# Patient Record
Sex: FEMALE | Race: WHITE | HISPANIC OR LATINO | Employment: FULL TIME | URBAN - METROPOLITAN AREA
[De-identification: names, ages, dates, MRNs, and addresses within clinical notes are randomized per-mention and may not be internally consistent; named-entity substitution may affect disease eponyms.]

---

## 2019-08-14 ENCOUNTER — TRANSCRIBE ORDERS (OUTPATIENT)
Dept: URGENT CARE | Facility: MEDICAL CENTER | Age: 39
End: 2019-08-14

## 2019-08-14 ENCOUNTER — APPOINTMENT (OUTPATIENT)
Dept: LAB | Facility: MEDICAL CENTER | Age: 39
End: 2019-08-14

## 2019-08-14 ENCOUNTER — APPOINTMENT (OUTPATIENT)
Dept: URGENT CARE | Facility: MEDICAL CENTER | Age: 39
End: 2019-08-14

## 2019-08-14 DIAGNOSIS — Z02.1 PRE-EMPLOYMENT HEALTH SCREENING EXAMINATION: Primary | ICD-10-CM

## 2019-08-14 DIAGNOSIS — Z02.1 PRE-EMPLOYMENT HEALTH SCREENING EXAMINATION: ICD-10-CM

## 2019-08-14 PROCEDURE — 36415 COLL VENOUS BLD VENIPUNCTURE: CPT

## 2019-08-14 PROCEDURE — 86480 TB TEST CELL IMMUN MEASURE: CPT

## 2019-08-15 LAB
GAMMA INTERFERON BACKGROUND BLD IA-ACNC: 0.05 IU/ML
M TB IFN-G BLD-IMP: NEGATIVE
M TB IFN-G CD4+ BCKGRND COR BLD-ACNC: 0 IU/ML
M TB IFN-G CD4+ BCKGRND COR BLD-ACNC: 0.01 IU/ML
MITOGEN IGNF BCKGRD COR BLD-ACNC: >10 IU/ML

## 2020-11-24 ENCOUNTER — HOSPITAL ENCOUNTER (EMERGENCY)
Facility: HOSPITAL | Age: 40
Discharge: HOME | End: 2020-11-24
Attending: EMERGENCY MEDICINE | Admitting: EMERGENCY MEDICINE

## 2020-11-24 VITALS
BODY MASS INDEX: 30.18 KG/M2 | WEIGHT: 178.57 LBS | HEART RATE: 68 BPM | RESPIRATION RATE: 18 BRPM | OXYGEN SATURATION: 99 % | TEMPERATURE: 98.2 F | DIASTOLIC BLOOD PRESSURE: 60 MMHG | SYSTOLIC BLOOD PRESSURE: 126 MMHG

## 2020-11-24 DIAGNOSIS — B34.9 VIRAL SYNDROME: Primary | ICD-10-CM

## 2020-11-24 LAB — S PYO DNA THROAT QL NAA+PROBE: NORMAL

## 2020-11-24 PROCEDURE — 99283 EMERGENCY DEPT VISIT LOW MDM: CPT

## 2020-11-24 PROCEDURE — 87637 SARSCOV2&INF A&B&RSV AMP PRB: CPT | Performed by: PHYSICIAN ASSISTANT

## 2020-11-24 PROCEDURE — 87651 STREP A DNA AMP PROBE: CPT | Performed by: PHYSICIAN ASSISTANT

## 2020-11-24 PROCEDURE — 99282 EMERGENCY DEPT VISIT SF MDM: CPT | Performed by: PHYSICIAN ASSISTANT

## 2020-11-26 LAB
FLUAV RNA NPH QL NAA+PROBE: NOT DETECTED
FLUBV RNA NPH QL NAA+PROBE: NOT DETECTED
RSV RNA NPH QL NAA+PROBE: NOT DETECTED
SARS-COV-2 RNA NPH QL NAA+PROBE: DETECTED

## 2021-10-18 ENCOUNTER — ANESTHESIA EVENT (EMERGENCY)
Dept: PERIOP | Facility: HOSPITAL | Age: 41
End: 2021-10-18
Payer: COMMERCIAL

## 2021-10-18 ENCOUNTER — ANESTHESIA (EMERGENCY)
Dept: PERIOP | Facility: HOSPITAL | Age: 41
End: 2021-10-18
Payer: COMMERCIAL

## 2021-10-18 ENCOUNTER — HOSPITAL ENCOUNTER (OUTPATIENT)
Facility: HOSPITAL | Age: 41
Setting detail: OUTPATIENT SURGERY
Discharge: HOME | End: 2021-10-19
Attending: EMERGENCY MEDICINE | Admitting: OBSTETRICS & GYNECOLOGY
Payer: COMMERCIAL

## 2021-10-18 DIAGNOSIS — O03.4 RETAINED PRODUCTS OF CONCEPTION FOLLOWING ABORTION: ICD-10-CM

## 2021-10-18 DIAGNOSIS — Z33.2 MEDICAL ABORTION: Primary | ICD-10-CM

## 2021-10-18 DIAGNOSIS — N93.9 VAGINAL BLEEDING: ICD-10-CM

## 2021-10-18 DIAGNOSIS — Z98.890 S/P D&C (STATUS POST DILATION AND CURETTAGE): ICD-10-CM

## 2021-10-18 LAB
ABO GROUP BLD: NORMAL
ABO GROUP BLD: NORMAL
ALBUMIN SERPL BCP-MCNC: 3.4 G/DL (ref 3.5–5)
ALP SERPL-CCNC: 53 U/L (ref 46–116)
ALT SERPL W P-5'-P-CCNC: 22 U/L (ref 12–78)
ANION GAP SERPL CALCULATED.3IONS-SCNC: 11 MMOL/L (ref 4–13)
APTT PPP: 22 SECONDS (ref 23–37)
AST SERPL W P-5'-P-CCNC: 13 U/L (ref 5–45)
ATRIAL RATE: 110 BPM
B-HCG SERPL-ACNC: 7522.2 MIU/ML
BASOPHILS # BLD AUTO: 0.07 THOUSANDS/ΜL (ref 0–0.1)
BASOPHILS NFR BLD AUTO: 1 % (ref 0–1)
BILIRUB SERPL-MCNC: 0.22 MG/DL (ref 0.2–1)
BLD GP AB SCN SERPL QL: NEGATIVE
BUN SERPL-MCNC: 11 MG/DL (ref 5–25)
CALCIUM ALBUM COR SERPL-MCNC: 9.2 MG/DL (ref 8.3–10.1)
CALCIUM SERPL-MCNC: 8.7 MG/DL (ref 8.3–10.1)
CHLORIDE SERPL-SCNC: 103 MMOL/L (ref 100–108)
CO2 SERPL-SCNC: 21 MMOL/L (ref 21–32)
CREAT SERPL-MCNC: 0.92 MG/DL (ref 0.6–1.3)
EOSINOPHIL # BLD AUTO: 0.23 THOUSAND/ΜL (ref 0–0.61)
EOSINOPHIL NFR BLD AUTO: 2 % (ref 0–6)
ERYTHROCYTE [DISTWIDTH] IN BLOOD BY AUTOMATED COUNT: 13.1 % (ref 11.6–15.1)
EXT PREG TEST URINE: POSITIVE
EXT. CONTROL ED NAV: ABNORMAL
GFR SERPL CREATININE-BSD FRML MDRD: 77 ML/MIN/1.73SQ M
GLUCOSE SERPL-MCNC: 132 MG/DL (ref 65–140)
HCT VFR BLD AUTO: 33.6 % (ref 34.8–46.1)
HGB BLD-MCNC: 11.2 G/DL (ref 11.5–15.4)
IMM GRANULOCYTES # BLD AUTO: 0.03 THOUSAND/UL (ref 0–0.2)
IMM GRANULOCYTES NFR BLD AUTO: 0 % (ref 0–2)
INR PPP: 1.07 (ref 0.84–1.19)
LYMPHOCYTES # BLD AUTO: 5.9 THOUSANDS/ΜL (ref 0.6–4.47)
LYMPHOCYTES NFR BLD AUTO: 47 % (ref 14–44)
MCH RBC QN AUTO: 28.9 PG (ref 26.8–34.3)
MCHC RBC AUTO-ENTMCNC: 33.3 G/DL (ref 31.4–37.4)
MCV RBC AUTO: 87 FL (ref 82–98)
MONOCYTES # BLD AUTO: 0.96 THOUSAND/ΜL (ref 0.17–1.22)
MONOCYTES NFR BLD AUTO: 8 % (ref 4–12)
NEUTROPHILS # BLD AUTO: 5.21 THOUSANDS/ΜL (ref 1.85–7.62)
NEUTS SEG NFR BLD AUTO: 42 % (ref 43–75)
NRBC BLD AUTO-RTO: 0 /100 WBCS
P AXIS: 64 DEGREES
PLATELET # BLD AUTO: 295 THOUSANDS/UL (ref 149–390)
PMV BLD AUTO: 10.1 FL (ref 8.9–12.7)
POTASSIUM SERPL-SCNC: 3.3 MMOL/L (ref 3.5–5.3)
PR INTERVAL: 138 MS
PROT SERPL-MCNC: 7.1 G/DL (ref 6.4–8.2)
PROTHROMBIN TIME: 13.5 SECONDS (ref 11.6–14.5)
QRS AXIS: 62 DEGREES
QRSD INTERVAL: 78 MS
QT INTERVAL: 328 MS
QTC INTERVAL: 435 MS
RBC # BLD AUTO: 3.87 MILLION/UL (ref 3.81–5.12)
RH BLD: POSITIVE
RH BLD: POSITIVE
SODIUM SERPL-SCNC: 135 MMOL/L (ref 136–145)
SPECIMEN EXPIRATION DATE: NORMAL
T WAVE AXIS: 58 DEGREES
VENTRICULAR RATE: 106 BPM
WBC # BLD AUTO: 12.4 THOUSAND/UL (ref 4.31–10.16)

## 2021-10-18 PROCEDURE — 99285 EMERGENCY DEPT VISIT HI MDM: CPT

## 2021-10-18 PROCEDURE — 88305 TISSUE EXAM BY PATHOLOGIST: CPT | Performed by: PATHOLOGY

## 2021-10-18 PROCEDURE — 59812 TREATMENT OF MISCARRIAGE: CPT | Performed by: OBSTETRICS & GYNECOLOGY

## 2021-10-18 PROCEDURE — 81025 URINE PREGNANCY TEST: CPT | Performed by: EMERGENCY MEDICINE

## 2021-10-18 PROCEDURE — 87491 CHLMYD TRACH DNA AMP PROBE: CPT | Performed by: EMERGENCY MEDICINE

## 2021-10-18 PROCEDURE — 87591 N.GONORRHOEAE DNA AMP PROB: CPT | Performed by: EMERGENCY MEDICINE

## 2021-10-18 PROCEDURE — 80053 COMPREHEN METABOLIC PANEL: CPT | Performed by: EMERGENCY MEDICINE

## 2021-10-18 PROCEDURE — 86901 BLOOD TYPING SEROLOGIC RH(D): CPT | Performed by: EMERGENCY MEDICINE

## 2021-10-18 PROCEDURE — 93010 ELECTROCARDIOGRAM REPORT: CPT | Performed by: INTERNAL MEDICINE

## 2021-10-18 PROCEDURE — 99203 OFFICE O/P NEW LOW 30 MIN: CPT | Performed by: OBSTETRICS & GYNECOLOGY

## 2021-10-18 PROCEDURE — 85730 THROMBOPLASTIN TIME PARTIAL: CPT | Performed by: EMERGENCY MEDICINE

## 2021-10-18 PROCEDURE — 86900 BLOOD TYPING SEROLOGIC ABO: CPT | Performed by: EMERGENCY MEDICINE

## 2021-10-18 PROCEDURE — 99285 EMERGENCY DEPT VISIT HI MDM: CPT | Performed by: EMERGENCY MEDICINE

## 2021-10-18 PROCEDURE — 87040 BLOOD CULTURE FOR BACTERIA: CPT | Performed by: EMERGENCY MEDICINE

## 2021-10-18 PROCEDURE — 85025 COMPLETE CBC W/AUTO DIFF WBC: CPT | Performed by: EMERGENCY MEDICINE

## 2021-10-18 PROCEDURE — 88342 IMHCHEM/IMCYTCHM 1ST ANTB: CPT | Performed by: PATHOLOGY

## 2021-10-18 PROCEDURE — 93005 ELECTROCARDIOGRAM TRACING: CPT

## 2021-10-18 PROCEDURE — 84702 CHORIONIC GONADOTROPIN TEST: CPT | Performed by: EMERGENCY MEDICINE

## 2021-10-18 PROCEDURE — 85610 PROTHROMBIN TIME: CPT | Performed by: EMERGENCY MEDICINE

## 2021-10-18 PROCEDURE — 86850 RBC ANTIBODY SCREEN: CPT | Performed by: EMERGENCY MEDICINE

## 2021-10-18 PROCEDURE — 36415 COLL VENOUS BLD VENIPUNCTURE: CPT | Performed by: EMERGENCY MEDICINE

## 2021-10-18 PROCEDURE — 88182 CELL MARKER STUDY: CPT | Performed by: PATHOLOGY

## 2021-10-18 PROCEDURE — NC001 PR NO CHARGE: Performed by: OBSTETRICS & GYNECOLOGY

## 2021-10-18 RX ORDER — ACETAMINOPHEN 325 MG/1
975 TABLET ORAL ONCE
Status: COMPLETED | OUTPATIENT
Start: 2021-10-18 | End: 2021-10-18

## 2021-10-18 RX ORDER — RIBOFLAVIN (VITAMIN B2) 100 MG
1 TABLET ORAL DAILY
COMMUNITY

## 2021-10-18 RX ORDER — ONDANSETRON 2 MG/ML
4 INJECTION INTRAMUSCULAR; INTRAVENOUS EVERY 6 HOURS PRN
Status: DISCONTINUED | OUTPATIENT
Start: 2021-10-18 | End: 2021-10-19 | Stop reason: HOSPADM

## 2021-10-18 RX ORDER — ONDANSETRON 2 MG/ML
4 INJECTION INTRAMUSCULAR; INTRAVENOUS ONCE AS NEEDED
Status: DISCONTINUED | OUTPATIENT
Start: 2021-10-18 | End: 2021-10-18 | Stop reason: HOSPADM

## 2021-10-18 RX ORDER — MIDAZOLAM HYDROCHLORIDE 2 MG/2ML
INJECTION, SOLUTION INTRAMUSCULAR; INTRAVENOUS AS NEEDED
Status: DISCONTINUED | OUTPATIENT
Start: 2021-10-18 | End: 2021-10-18

## 2021-10-18 RX ORDER — DEXAMETHASONE SODIUM PHOSPHATE 10 MG/ML
INJECTION, SOLUTION INTRAMUSCULAR; INTRAVENOUS AS NEEDED
Status: DISCONTINUED | OUTPATIENT
Start: 2021-10-18 | End: 2021-10-18

## 2021-10-18 RX ORDER — DOXYCYCLINE HYCLATE 100 MG/1
200 CAPSULE ORAL ONCE
Status: COMPLETED | OUTPATIENT
Start: 2021-10-18 | End: 2021-10-18

## 2021-10-18 RX ORDER — HYDROMORPHONE HCL/PF 1 MG/ML
0.5 SYRINGE (ML) INJECTION
Status: DISCONTINUED | OUTPATIENT
Start: 2021-10-18 | End: 2021-10-18 | Stop reason: HOSPADM

## 2021-10-18 RX ORDER — PROPOFOL 10 MG/ML
INJECTION, EMULSION INTRAVENOUS AS NEEDED
Status: DISCONTINUED | OUTPATIENT
Start: 2021-10-18 | End: 2021-10-18

## 2021-10-18 RX ORDER — KETOROLAC TROMETHAMINE 30 MG/ML
INJECTION, SOLUTION INTRAMUSCULAR; INTRAVENOUS AS NEEDED
Status: DISCONTINUED | OUTPATIENT
Start: 2021-10-18 | End: 2021-10-18

## 2021-10-18 RX ORDER — SODIUM CHLORIDE 9 MG/ML
INJECTION, SOLUTION INTRAVENOUS CONTINUOUS PRN
Status: DISCONTINUED | OUTPATIENT
Start: 2021-10-18 | End: 2021-10-18

## 2021-10-18 RX ORDER — SUCCINYLCHOLINE/SOD CL,ISO/PF 100 MG/5ML
SYRINGE (ML) INTRAVENOUS AS NEEDED
Status: DISCONTINUED | OUTPATIENT
Start: 2021-10-18 | End: 2021-10-18

## 2021-10-18 RX ORDER — LIDOCAINE HYDROCHLORIDE 10 MG/ML
INJECTION, SOLUTION EPIDURAL; INFILTRATION; INTRACAUDAL; PERINEURAL AS NEEDED
Status: DISCONTINUED | OUTPATIENT
Start: 2021-10-18 | End: 2021-10-18

## 2021-10-18 RX ORDER — ONDANSETRON 2 MG/ML
INJECTION INTRAMUSCULAR; INTRAVENOUS AS NEEDED
Status: DISCONTINUED | OUTPATIENT
Start: 2021-10-18 | End: 2021-10-18

## 2021-10-18 RX ORDER — FENTANYL CITRATE 50 UG/ML
INJECTION, SOLUTION INTRAMUSCULAR; INTRAVENOUS AS NEEDED
Status: DISCONTINUED | OUTPATIENT
Start: 2021-10-18 | End: 2021-10-18

## 2021-10-18 RX ORDER — ACETAMINOPHEN 325 MG/1
650 TABLET ORAL EVERY 6 HOURS SCHEDULED
Status: DISCONTINUED | OUTPATIENT
Start: 2021-10-19 | End: 2021-10-19 | Stop reason: HOSPADM

## 2021-10-18 RX ORDER — FENTANYL CITRATE/PF 50 MCG/ML
50 SYRINGE (ML) INJECTION
Status: DISCONTINUED | OUTPATIENT
Start: 2021-10-18 | End: 2021-10-18 | Stop reason: HOSPADM

## 2021-10-18 RX ORDER — IBUPROFEN 600 MG/1
600 TABLET ORAL EVERY 6 HOURS PRN
Qty: 30 TABLET | Refills: 0
Start: 2021-10-18 | End: 2021-10-19 | Stop reason: SDUPTHER

## 2021-10-18 RX ADMIN — MIDAZOLAM 2 MG: 1 INJECTION INTRAMUSCULAR; INTRAVENOUS at 20:06

## 2021-10-18 RX ADMIN — FENTANYL CITRATE 100 MCG: 50 INJECTION INTRAMUSCULAR; INTRAVENOUS at 20:16

## 2021-10-18 RX ADMIN — PROPOFOL 200 MG: 10 INJECTION, EMULSION INTRAVENOUS at 20:16

## 2021-10-18 RX ADMIN — ONDANSETRON 4 MG: 2 INJECTION INTRAMUSCULAR; INTRAVENOUS at 20:24

## 2021-10-18 RX ADMIN — KETOROLAC TROMETHAMINE 30 MG: 30 INJECTION, SOLUTION INTRAMUSCULAR at 20:36

## 2021-10-18 RX ADMIN — FENTANYL CITRATE 50 MCG: 50 INJECTION INTRAMUSCULAR; INTRAVENOUS at 21:11

## 2021-10-18 RX ADMIN — SODIUM CHLORIDE 1000 ML: 0.9 INJECTION, SOLUTION INTRAVENOUS at 16:50

## 2021-10-18 RX ADMIN — DOXYCYCLINE 200 MG: 100 CAPSULE ORAL at 19:18

## 2021-10-18 RX ADMIN — Medication 100 MG: at 20:16

## 2021-10-18 RX ADMIN — DEXAMETHASONE SODIUM PHOSPHATE 4 MG: 10 INJECTION INTRAMUSCULAR; INTRAVENOUS at 20:24

## 2021-10-18 RX ADMIN — SODIUM CHLORIDE: 0.9 INJECTION, SOLUTION INTRAVENOUS at 20:05

## 2021-10-18 RX ADMIN — LIDOCAINE HYDROCHLORIDE 60 MG: 10 INJECTION, SOLUTION EPIDURAL; INFILTRATION; INTRACAUDAL; PERINEURAL at 20:16

## 2021-10-18 RX ADMIN — ACETAMINOPHEN 975 MG: 325 TABLET, FILM COATED ORAL at 17:19

## 2021-10-19 VITALS
WEIGHT: 189.38 LBS | SYSTOLIC BLOOD PRESSURE: 109 MMHG | TEMPERATURE: 97.8 F | BODY MASS INDEX: 31.55 KG/M2 | DIASTOLIC BLOOD PRESSURE: 67 MMHG | RESPIRATION RATE: 18 BRPM | HEIGHT: 65 IN | HEART RATE: 89 BPM | OXYGEN SATURATION: 100 %

## 2021-10-19 PROCEDURE — NC001 PR NO CHARGE: Performed by: OBSTETRICS & GYNECOLOGY

## 2021-10-19 RX ORDER — IBUPROFEN 600 MG/1
600 TABLET ORAL EVERY 6 HOURS PRN
Qty: 30 TABLET | Refills: 0 | Status: SHIPPED | OUTPATIENT
Start: 2021-10-19 | End: 2021-10-31

## 2021-10-19 RX ORDER — NORETHINDRONE ACETATE AND ETHINYL ESTRADIOL 1MG-20(21)
1 KIT ORAL DAILY
Qty: 28 TABLET | Refills: 12 | Status: SHIPPED | OUTPATIENT
Start: 2021-10-19 | End: 2022-10-19

## 2021-10-19 RX ADMIN — ACETAMINOPHEN 650 MG: 325 TABLET, FILM COATED ORAL at 11:07

## 2021-10-19 RX ADMIN — ACETAMINOPHEN 650 MG: 325 TABLET, FILM COATED ORAL at 06:23

## 2021-10-20 LAB
C TRACH DNA SPEC QL NAA+PROBE: ABNORMAL
N GONORRHOEA DNA SPEC QL NAA+PROBE: ABNORMAL

## 2021-10-23 LAB
BACTERIA BLD CULT: NORMAL
BACTERIA BLD CULT: NORMAL

## 2021-10-29 DIAGNOSIS — Z98.890 S/P D&C (STATUS POST DILATION AND CURETTAGE): ICD-10-CM

## 2021-10-31 RX ORDER — IBUPROFEN 600 MG/1
TABLET ORAL
Qty: 30 TABLET | Refills: 0 | Status: SHIPPED | OUTPATIENT
Start: 2021-10-31 | End: 2021-12-01

## 2021-11-09 ENCOUNTER — TELEPHONE (OUTPATIENT)
Dept: OBGYN CLINIC | Facility: CLINIC | Age: 41
End: 2021-11-09

## 2021-11-10 ENCOUNTER — TELEPHONE (OUTPATIENT)
Dept: OBGYN CLINIC | Facility: CLINIC | Age: 41
End: 2021-11-10

## 2021-11-22 ENCOUNTER — TELEPHONE (OUTPATIENT)
Dept: OTHER | Facility: HOSPITAL | Age: 41
End: 2021-11-22

## 2021-11-22 DIAGNOSIS — O08.89 PARTIAL MOLAR PREGNANCY: Primary | ICD-10-CM

## 2021-11-26 ENCOUNTER — APPOINTMENT (OUTPATIENT)
Dept: LAB | Facility: HOSPITAL | Age: 41
End: 2021-11-26
Payer: COMMERCIAL

## 2021-11-26 ENCOUNTER — HOSPITAL ENCOUNTER (OUTPATIENT)
Dept: RADIOLOGY | Facility: HOSPITAL | Age: 41
Discharge: HOME | End: 2021-11-26
Payer: COMMERCIAL

## 2021-11-26 DIAGNOSIS — O08.89 PARTIAL MOLAR PREGNANCY: ICD-10-CM

## 2021-11-26 LAB
ALBUMIN SERPL BCP-MCNC: 3.6 G/DL (ref 3.5–5)
ALP SERPL-CCNC: 65 U/L (ref 46–116)
ALT SERPL W P-5'-P-CCNC: 25 U/L (ref 12–78)
ANION GAP SERPL CALCULATED.3IONS-SCNC: 10 MMOL/L (ref 4–13)
AST SERPL W P-5'-P-CCNC: 16 U/L (ref 5–45)
B-HCG SERPL-ACNC: 15.4 MIU/ML
BILIRUB SERPL-MCNC: 0.17 MG/DL (ref 0.2–1)
BUN SERPL-MCNC: 12 MG/DL (ref 5–25)
CALCIUM SERPL-MCNC: 8.8 MG/DL (ref 8.3–10.1)
CHLORIDE SERPL-SCNC: 104 MMOL/L (ref 100–108)
CO2 SERPL-SCNC: 26 MMOL/L (ref 21–32)
CREAT SERPL-MCNC: 0.78 MG/DL (ref 0.6–1.3)
ERYTHROCYTE [DISTWIDTH] IN BLOOD BY AUTOMATED COUNT: 17.1 % (ref 11.6–15.1)
GFR SERPL CREATININE-BSD FRML MDRD: 94 ML/MIN/1.73SQ M
GLUCOSE P FAST SERPL-MCNC: 88 MG/DL (ref 65–99)
HCT VFR BLD AUTO: 27.9 % (ref 34.8–46.1)
HGB BLD-MCNC: 8.2 G/DL (ref 11.5–15.4)
MCH RBC QN AUTO: 23.4 PG (ref 26.8–34.3)
MCHC RBC AUTO-ENTMCNC: 29.4 G/DL (ref 31.4–37.4)
MCV RBC AUTO: 80 FL (ref 82–98)
PLATELET # BLD AUTO: 420 THOUSANDS/UL (ref 149–390)
PMV BLD AUTO: 10 FL (ref 8.9–12.7)
POTASSIUM SERPL-SCNC: 3.9 MMOL/L (ref 3.5–5.3)
PROT SERPL-MCNC: 7.2 G/DL (ref 6.4–8.2)
RBC # BLD AUTO: 3.5 MILLION/UL (ref 3.81–5.12)
SODIUM SERPL-SCNC: 140 MMOL/L (ref 136–145)
TSH SERPL DL<=0.05 MIU/L-ACNC: 0.96 UIU/ML (ref 0.36–3.74)
WBC # BLD AUTO: 6.53 THOUSAND/UL (ref 4.31–10.16)

## 2021-11-26 PROCEDURE — 36415 COLL VENOUS BLD VENIPUNCTURE: CPT

## 2021-11-26 PROCEDURE — 84702 CHORIONIC GONADOTROPIN TEST: CPT

## 2021-11-26 PROCEDURE — 84443 ASSAY THYROID STIM HORMONE: CPT

## 2021-11-26 PROCEDURE — 71046 X-RAY EXAM CHEST 2 VIEWS: CPT

## 2021-11-26 PROCEDURE — 80053 COMPREHEN METABOLIC PANEL: CPT

## 2021-11-26 PROCEDURE — 85027 COMPLETE CBC AUTOMATED: CPT

## 2021-11-29 PROBLEM — Z98.890 S/P D&C (STATUS POST DILATION AND CURETTAGE): Status: RESOLVED | Noted: 2021-10-18 | Resolved: 2021-11-29

## 2021-11-29 PROBLEM — O02.0 MOLAR PREGNANCY: Status: ACTIVE | Noted: 2021-11-29

## 2021-11-29 PROBLEM — O03.4 RETAINED PRODUCTS OF CONCEPTION FOLLOWING ABORTION: Status: RESOLVED | Noted: 2021-10-18 | Resolved: 2021-11-29

## 2021-12-01 ENCOUNTER — OFFICE VISIT (OUTPATIENT)
Dept: OBGYN CLINIC | Facility: CLINIC | Age: 41
End: 2021-12-01

## 2021-12-01 VITALS
DIASTOLIC BLOOD PRESSURE: 76 MMHG | WEIGHT: 178.8 LBS | HEIGHT: 65 IN | BODY MASS INDEX: 29.79 KG/M2 | SYSTOLIC BLOOD PRESSURE: 116 MMHG | HEART RATE: 78 BPM

## 2021-12-01 DIAGNOSIS — L08.9 SKIN PUSTULE: ICD-10-CM

## 2021-12-01 DIAGNOSIS — D50.8 IRON DEFICIENCY ANEMIA SECONDARY TO INADEQUATE DIETARY IRON INTAKE: ICD-10-CM

## 2021-12-01 DIAGNOSIS — O02.0 MOLAR PREGNANCY: Primary | ICD-10-CM

## 2021-12-01 PROCEDURE — 99203 OFFICE O/P NEW LOW 30 MIN: CPT | Performed by: OBSTETRICS & GYNECOLOGY

## 2021-12-01 RX ORDER — CYANOCOBALAMIN 1000 UG/ML
1000 INJECTION INTRAMUSCULAR; SUBCUTANEOUS ONCE
Status: CANCELLED | OUTPATIENT
Start: 2021-12-08 | End: 2021-12-01

## 2021-12-05 ENCOUNTER — APPOINTMENT (OUTPATIENT)
Dept: LAB | Facility: HOSPITAL | Age: 41
End: 2021-12-05
Payer: COMMERCIAL

## 2021-12-05 DIAGNOSIS — O02.0 MOLAR PREGNANCY: ICD-10-CM

## 2021-12-05 LAB — B-HCG SERPL-ACNC: 5.8 MIU/ML

## 2021-12-05 PROCEDURE — 36415 COLL VENOUS BLD VENIPUNCTURE: CPT

## 2021-12-05 PROCEDURE — 84702 CHORIONIC GONADOTROPIN TEST: CPT

## 2021-12-06 ENCOUNTER — TELEPHONE (OUTPATIENT)
Dept: OTHER | Facility: HOSPITAL | Age: 41
End: 2021-12-06

## 2021-12-06 DIAGNOSIS — O08.89 PARTIAL MOLAR PREGNANCY: Primary | ICD-10-CM

## 2021-12-08 ENCOUNTER — HOSPITAL ENCOUNTER (OUTPATIENT)
Dept: INFUSION CENTER | Facility: CLINIC | Age: 41
Discharge: HOME | End: 2021-12-08

## 2021-12-08 DIAGNOSIS — D50.8 IRON DEFICIENCY ANEMIA SECONDARY TO INADEQUATE DIETARY IRON INTAKE: Primary | ICD-10-CM

## 2021-12-08 DIAGNOSIS — O02.0 MOLAR PREGNANCY: ICD-10-CM

## 2021-12-08 RX ORDER — SODIUM CHLORIDE 9 MG/ML
20 INJECTION, SOLUTION INTRAVENOUS ONCE
Status: DISCONTINUED | OUTPATIENT
Start: 2021-12-08 | End: 2021-12-11 | Stop reason: HOSPADM

## 2021-12-08 RX ORDER — SODIUM CHLORIDE 9 MG/ML
20 INJECTION, SOLUTION INTRAVENOUS ONCE
Status: CANCELLED | OUTPATIENT
Start: 2021-12-08

## 2021-12-08 RX ORDER — CYANOCOBALAMIN 1000 UG/ML
1000 INJECTION INTRAMUSCULAR; SUBCUTANEOUS ONCE
Status: CANCELLED | OUTPATIENT
Start: 2021-12-10 | End: 2021-12-08

## 2021-12-08 RX ORDER — CYANOCOBALAMIN 1000 UG/ML
1000 INJECTION INTRAMUSCULAR; SUBCUTANEOUS ONCE
Status: DISCONTINUED | OUTPATIENT
Start: 2021-12-08 | End: 2021-12-11 | Stop reason: HOSPADM

## 2021-12-08 RX ORDER — SODIUM CHLORIDE 9 MG/ML
20 INJECTION, SOLUTION INTRAVENOUS ONCE
Status: CANCELLED | OUTPATIENT
Start: 2021-12-10

## 2021-12-10 ENCOUNTER — HOSPITAL ENCOUNTER (OUTPATIENT)
Dept: INFUSION CENTER | Facility: CLINIC | Age: 41
Discharge: HOME | End: 2021-12-10
Payer: COMMERCIAL

## 2021-12-10 VITALS
HEART RATE: 92 BPM | RESPIRATION RATE: 18 BRPM | DIASTOLIC BLOOD PRESSURE: 72 MMHG | SYSTOLIC BLOOD PRESSURE: 111 MMHG | TEMPERATURE: 98.1 F

## 2021-12-10 DIAGNOSIS — D50.8 IRON DEFICIENCY ANEMIA SECONDARY TO INADEQUATE DIETARY IRON INTAKE: Primary | ICD-10-CM

## 2021-12-10 DIAGNOSIS — O02.0 MOLAR PREGNANCY: ICD-10-CM

## 2021-12-10 PROCEDURE — 96365 THER/PROPH/DIAG IV INF INIT: CPT

## 2021-12-10 PROCEDURE — 96372 THER/PROPH/DIAG INJ SC/IM: CPT

## 2021-12-10 RX ORDER — CYANOCOBALAMIN 1000 UG/ML
1000 INJECTION INTRAMUSCULAR; SUBCUTANEOUS ONCE
Status: COMPLETED | OUTPATIENT
Start: 2021-12-10 | End: 2021-12-10

## 2021-12-10 RX ORDER — CYANOCOBALAMIN 1000 UG/ML
1000 INJECTION INTRAMUSCULAR; SUBCUTANEOUS ONCE
Status: CANCELLED | OUTPATIENT
Start: 2021-12-10 | End: 2021-12-10

## 2021-12-10 RX ORDER — SODIUM CHLORIDE 9 MG/ML
20 INJECTION, SOLUTION INTRAVENOUS ONCE
Status: CANCELLED | OUTPATIENT
Start: 2021-12-12

## 2021-12-10 RX ORDER — SODIUM CHLORIDE 9 MG/ML
20 INJECTION, SOLUTION INTRAVENOUS ONCE
Status: COMPLETED | OUTPATIENT
Start: 2021-12-10 | End: 2021-12-10

## 2021-12-10 RX ORDER — SODIUM CHLORIDE 9 MG/ML
20 INJECTION, SOLUTION INTRAVENOUS ONCE
Status: CANCELLED | OUTPATIENT
Start: 2021-12-13

## 2021-12-10 RX ADMIN — SODIUM CHLORIDE 200 MG: 9 INJECTION, SOLUTION INTRAVENOUS at 09:38

## 2021-12-10 RX ADMIN — CYANOCOBALAMIN 1000 MCG: 1000 INJECTION, SOLUTION INTRAMUSCULAR at 09:29

## 2021-12-10 RX ADMIN — SODIUM CHLORIDE 20 ML/HR: 0.9 INJECTION, SOLUTION INTRAVENOUS at 09:37

## 2021-12-13 ENCOUNTER — HOSPITAL ENCOUNTER (OUTPATIENT)
Dept: INFUSION CENTER | Facility: CLINIC | Age: 41
Discharge: HOME | End: 2021-12-13
Payer: COMMERCIAL

## 2021-12-13 DIAGNOSIS — D50.8 IRON DEFICIENCY ANEMIA SECONDARY TO INADEQUATE DIETARY IRON INTAKE: Primary | ICD-10-CM

## 2021-12-13 PROCEDURE — 96365 THER/PROPH/DIAG IV INF INIT: CPT

## 2021-12-13 RX ORDER — SODIUM CHLORIDE 9 MG/ML
20 INJECTION, SOLUTION INTRAVENOUS ONCE
Status: CANCELLED | OUTPATIENT
Start: 2021-12-13

## 2021-12-13 RX ORDER — SODIUM CHLORIDE 9 MG/ML
20 INJECTION, SOLUTION INTRAVENOUS ONCE
Status: COMPLETED | OUTPATIENT
Start: 2021-12-13 | End: 2021-12-13

## 2021-12-13 RX ADMIN — IRON SUCROSE 200 MG: 20 INJECTION, SOLUTION INTRAVENOUS at 10:19

## 2021-12-13 RX ADMIN — SODIUM CHLORIDE 20 ML/HR: 0.9 INJECTION, SOLUTION INTRAVENOUS at 10:10

## 2021-12-18 ENCOUNTER — APPOINTMENT (OUTPATIENT)
Dept: LAB | Facility: HOSPITAL | Age: 41
End: 2021-12-18
Payer: COMMERCIAL

## 2021-12-18 DIAGNOSIS — O08.89 PARTIAL MOLAR PREGNANCY: ICD-10-CM

## 2021-12-18 LAB — B-HCG SERPL-ACNC: <2 MIU/ML

## 2021-12-18 PROCEDURE — 36415 COLL VENOUS BLD VENIPUNCTURE: CPT

## 2021-12-18 PROCEDURE — 84702 CHORIONIC GONADOTROPIN TEST: CPT

## 2021-12-20 DIAGNOSIS — O02.0 MOLAR PREGNANCY: Primary | ICD-10-CM

## 2021-12-27 ENCOUNTER — APPOINTMENT (OUTPATIENT)
Dept: LAB | Facility: HOSPITAL | Age: 41
End: 2021-12-27
Payer: COMMERCIAL

## 2021-12-27 DIAGNOSIS — O02.0 MOLAR PREGNANCY: ICD-10-CM

## 2021-12-27 DIAGNOSIS — O02.0 MOLAR PREGNANCY: Primary | ICD-10-CM

## 2021-12-27 LAB
B-HCG SERPL-ACNC: <2 MIU/ML
ERYTHROCYTE [DISTWIDTH] IN BLOOD BY AUTOMATED COUNT: 22.6 % (ref 11.6–15.1)
HCT VFR BLD AUTO: 33.2 % (ref 34.8–46.1)
HGB BLD-MCNC: 10 G/DL (ref 11.5–15.4)
MCH RBC QN AUTO: 23.9 PG (ref 26.8–34.3)
MCHC RBC AUTO-ENTMCNC: 30.1 G/DL (ref 31.4–37.4)
MCV RBC AUTO: 79 FL (ref 82–98)
PLATELET # BLD AUTO: 343 THOUSANDS/UL (ref 149–390)
PMV BLD AUTO: 10.1 FL (ref 8.9–12.7)
RBC # BLD AUTO: 4.19 MILLION/UL (ref 3.81–5.12)
WBC # BLD AUTO: 8.42 THOUSAND/UL (ref 4.31–10.16)

## 2021-12-27 PROCEDURE — 36415 COLL VENOUS BLD VENIPUNCTURE: CPT

## 2021-12-27 PROCEDURE — 85027 COMPLETE CBC AUTOMATED: CPT

## 2021-12-27 PROCEDURE — 84702 CHORIONIC GONADOTROPIN TEST: CPT

## 2022-01-04 ENCOUNTER — TELEPHONE (OUTPATIENT)
Dept: UROLOGY | Facility: HOSPITAL | Age: 42
End: 2022-01-04

## 2022-01-04 ENCOUNTER — TELEPHONE (OUTPATIENT)
Dept: OBGYN CLINIC | Facility: CLINIC | Age: 42
End: 2022-01-04

## 2022-01-04 NOTE — TELEPHONE ENCOUNTER
----- Message from Lamont Gatica sent at 1/4/2022 11:43 AM EST -----  Regarding: Good Morning I went to  the medicine last week they told me that it was not Send to the pharmacy I been waiting for the meditation for almost a week thanks   Waiting for Medication that you prescribe me

## 2022-01-07 ENCOUNTER — APPOINTMENT (OUTPATIENT)
Dept: LAB | Facility: HOSPITAL | Age: 42
End: 2022-01-07
Payer: COMMERCIAL

## 2022-01-07 DIAGNOSIS — O02.0 MOLAR PREGNANCY: ICD-10-CM

## 2022-01-07 LAB — B-HCG SERPL-ACNC: <2 MIU/ML

## 2022-01-07 PROCEDURE — 84702 CHORIONIC GONADOTROPIN TEST: CPT

## 2022-01-07 PROCEDURE — 36415 COLL VENOUS BLD VENIPUNCTURE: CPT

## 2022-04-13 ENCOUNTER — APPOINTMENT (EMERGENCY)
Dept: RADIOLOGY | Facility: HOSPITAL | Age: 42
End: 2022-04-13
Payer: COMMERCIAL

## 2022-04-13 ENCOUNTER — HOSPITAL ENCOUNTER (EMERGENCY)
Facility: HOSPITAL | Age: 42
Discharge: HOME | End: 2022-04-13
Attending: EMERGENCY MEDICINE | Admitting: EMERGENCY MEDICINE
Payer: COMMERCIAL

## 2022-04-13 VITALS
OXYGEN SATURATION: 100 % | BODY MASS INDEX: 29.39 KG/M2 | WEIGHT: 176.59 LBS | SYSTOLIC BLOOD PRESSURE: 114 MMHG | DIASTOLIC BLOOD PRESSURE: 62 MMHG | RESPIRATION RATE: 16 BRPM | TEMPERATURE: 98 F | HEART RATE: 72 BPM

## 2022-04-13 DIAGNOSIS — R07.89 CHEST TIGHTNESS: Primary | ICD-10-CM

## 2022-04-13 LAB
ANION GAP SERPL CALCULATED.3IONS-SCNC: 7 MMOL/L (ref 4–13)
ATRIAL RATE: 70 BPM
ATRIAL RATE: 76 BPM
BASOPHILS # BLD AUTO: 0.03 THOUSANDS/ΜL (ref 0–0.1)
BASOPHILS NFR BLD AUTO: 1 % (ref 0–1)
BUN SERPL-MCNC: 14 MG/DL (ref 5–25)
CALCIUM SERPL-MCNC: 8.7 MG/DL (ref 8.3–10.1)
CARDIAC TROPONIN I PNL SERPL HS: <2 NG/L
CHLORIDE SERPL-SCNC: 104 MMOL/L (ref 100–108)
CO2 SERPL-SCNC: 28 MMOL/L (ref 21–32)
CREAT SERPL-MCNC: 0.97 MG/DL (ref 0.6–1.3)
EOSINOPHIL # BLD AUTO: 0.11 THOUSAND/ΜL (ref 0–0.61)
EOSINOPHIL NFR BLD AUTO: 2 % (ref 0–6)
ERYTHROCYTE [DISTWIDTH] IN BLOOD BY AUTOMATED COUNT: 18 % (ref 11.6–15.1)
GFR SERPL CREATININE-BSD FRML MDRD: 71 ML/MIN/1.73SQ M
GLUCOSE SERPL-MCNC: 97 MG/DL (ref 65–140)
HCT VFR BLD AUTO: 37.7 % (ref 34.8–46.1)
HGB BLD-MCNC: 12.2 G/DL (ref 11.5–15.4)
IMM GRANULOCYTES # BLD AUTO: 0.01 THOUSAND/UL (ref 0–0.2)
IMM GRANULOCYTES NFR BLD AUTO: 0 % (ref 0–2)
LYMPHOCYTES # BLD AUTO: 2.36 THOUSANDS/ΜL (ref 0.6–4.47)
LYMPHOCYTES NFR BLD AUTO: 44 % (ref 14–44)
MCH RBC QN AUTO: 28.6 PG (ref 26.8–34.3)
MCHC RBC AUTO-ENTMCNC: 32.4 G/DL (ref 31.4–37.4)
MCV RBC AUTO: 89 FL (ref 82–98)
MONOCYTES # BLD AUTO: 0.45 THOUSAND/ΜL (ref 0.17–1.22)
MONOCYTES NFR BLD AUTO: 9 % (ref 4–12)
NEUTROPHILS # BLD AUTO: 2.35 THOUSANDS/ΜL (ref 1.85–7.62)
NEUTS SEG NFR BLD AUTO: 44 % (ref 43–75)
NRBC BLD AUTO-RTO: 0 /100 WBCS
P AXIS: 72 DEGREES
P AXIS: 72 DEGREES
PLATELET # BLD AUTO: 225 THOUSANDS/UL (ref 149–390)
PMV BLD AUTO: 9.7 FL (ref 8.9–12.7)
POTASSIUM SERPL-SCNC: 4.3 MMOL/L (ref 3.5–5.3)
PR INTERVAL: 150 MS
PR INTERVAL: 152 MS
QRS AXIS: 68 DEGREES
QRS AXIS: 68 DEGREES
QRSD INTERVAL: 86 MS
QRSD INTERVAL: 88 MS
QT INTERVAL: 366 MS
QT INTERVAL: 372 MS
QTC INTERVAL: 401 MS
QTC INTERVAL: 411 MS
RBC # BLD AUTO: 4.26 MILLION/UL (ref 3.81–5.12)
SODIUM SERPL-SCNC: 139 MMOL/L (ref 136–145)
T WAVE AXIS: 57 DEGREES
T WAVE AXIS: 59 DEGREES
VENTRICULAR RATE: 70 BPM
VENTRICULAR RATE: 76 BPM
WBC # BLD AUTO: 5.31 THOUSAND/UL (ref 4.31–10.16)

## 2022-04-13 PROCEDURE — 71046 X-RAY EXAM CHEST 2 VIEWS: CPT

## 2022-04-13 PROCEDURE — 99285 EMERGENCY DEPT VISIT HI MDM: CPT | Performed by: EMERGENCY MEDICINE

## 2022-04-13 PROCEDURE — 96374 THER/PROPH/DIAG INJ IV PUSH: CPT

## 2022-04-13 PROCEDURE — 85025 COMPLETE CBC W/AUTO DIFF WBC: CPT | Performed by: EMERGENCY MEDICINE

## 2022-04-13 PROCEDURE — 93005 ELECTROCARDIOGRAM TRACING: CPT

## 2022-04-13 PROCEDURE — 84484 ASSAY OF TROPONIN QUANT: CPT | Performed by: EMERGENCY MEDICINE

## 2022-04-13 PROCEDURE — 93010 ELECTROCARDIOGRAM REPORT: CPT | Performed by: INTERNAL MEDICINE

## 2022-04-13 PROCEDURE — 80048 BASIC METABOLIC PNL TOTAL CA: CPT | Performed by: EMERGENCY MEDICINE

## 2022-04-13 PROCEDURE — 99285 EMERGENCY DEPT VISIT HI MDM: CPT

## 2022-04-13 PROCEDURE — 36415 COLL VENOUS BLD VENIPUNCTURE: CPT | Performed by: EMERGENCY MEDICINE

## 2022-04-13 RX ORDER — KETOROLAC TROMETHAMINE 30 MG/ML
15 INJECTION, SOLUTION INTRAMUSCULAR; INTRAVENOUS ONCE
Status: COMPLETED | OUTPATIENT
Start: 2022-04-13 | End: 2022-04-13

## 2022-04-13 RX ADMIN — KETOROLAC TROMETHAMINE 15 MG: 30 INJECTION, SOLUTION INTRAMUSCULAR at 13:21

## 2022-04-13 NOTE — Clinical Note
Jonas Eli was seen and treated in our emergency department on 4/13/2022  No restrictions            Diagnosis:     Hannah Devlin  is off the rest of the shift today  She may return on this date: 04/14/2022         If you have any questions or concerns, please don't hesitate to call        Sridevi Noriega RN    ______________________________           _______________          _______________  Hospital Representative                              Date                                Time

## 2022-04-13 NOTE — ED PROVIDER NOTES
History  Chief Complaint   Patient presents with    Chest Pain     chest pain, dizziness and sob intermittent beginning yesterday  36 y o  F p/w chest pain x 1 day  Reports she had a bad day at work yesterday  Started with chest pain  Substernal   Constant  Nonradiating  Feels burning and tightness  States she woke up and pain was still there  "Sometimes" has SOB and nausea, but none currently  Had dizziness yesterday, but none currently  Denies F/C, cough, diaphoresis, abd pain, LE edema, calf pain/tenderness, fhx of MI, recent travel, h/o DM/HTN/HLD, drug use  History provided by:  Patient   used: No    Chest Pain  Pain location:  Substernal area  Pain quality: burning and tightness    Pain radiates to:  Does not radiate  Pain radiates to the back: no    Onset quality:  Gradual  Duration:  1 day  Timing:  Constant  Progression:  Unchanged  Chronicity:  New  Relieved by:  Nothing  Ineffective treatments: Tylenol  Associated symptoms: dizziness (Yesterday, none currently), nausea (None currently) and shortness of breath (None currently)    Associated symptoms: no abdominal pain, no cough, no diaphoresis, no lower extremity edema and not vomiting    Risk factors: no coronary artery disease, no diabetes mellitus, no high cholesterol, no hypertension and no smoking        Prior to Admission Medications   Prescriptions Last Dose Informant Patient Reported? Taking? Ascorbic Acid (vitamin C) 100 MG tablet   Yes Yes   Sig: Take 1 tablet by mouth daily   VITAMIN D PO   Yes No   Sig: Take 1 tablet by mouth daily   norethindrone-ethinyl estradiol (Junel FE 1/20) 1-20 MG-MCG per tablet   No Yes   Sig: Take 1 tablet by mouth daily      Facility-Administered Medications: None       History reviewed  No pertinent past medical history      Past Surgical History:   Procedure Laterality Date    ABDOMINOPLASTY      AUGMENTATION BREAST      implants    CARPAL TUNNEL RELEASE      COMBINED HYSTEROSCOPY DIAGNOSTIC / D&C      DILATION AND EVACUATION N/A 10/18/2021    Procedure: DILATATION AND EVACUATION (D&E) (5 WEEKS, retained POC); Surgeon: Rama Liang MD;  Location: [facility];  Service: Obstetrics    GASTRIC BYPASS         History reviewed  No pertinent family history  I have reviewed and agree with the history as documented  E-Cigarette/Vaping    E-Cigarette Use Never User      E-Cigarette/Vaping Substances    Nicotine No     THC No     CBD No     Flavoring No     Other No     Unknown No      Social History     Tobacco Use    Smoking status: Never Smoker    Smokeless tobacco: Never Used   Vaping Use    Vaping Use: Never used   Substance Use Topics    Alcohol use: Never    Drug use: Never       Review of Systems   Constitutional: Negative for diaphoresis  Respiratory: Positive for shortness of breath (None currently)  Negative for cough  Cardiovascular: Positive for chest pain  Negative for leg swelling  Gastrointestinal: Positive for nausea (None currently)  Negative for abdominal pain and vomiting  Neurological: Positive for dizziness (Yesterday, none currently)  All other systems reviewed and are negative  Physical Exam  Physical Exam  Vitals and nursing note reviewed  Constitutional:       General: She is not in acute distress  Appearance: She is well-developed  She is not ill-appearing, toxic-appearing or diaphoretic  HENT:      Head: Normocephalic and atraumatic  Eyes:      General: No scleral icterus  Conjunctiva/sclera:      Right eye: Right conjunctiva is not injected  Left eye: Left conjunctiva is not injected  Neck:      Vascular: No JVD  Trachea: Trachea normal    Cardiovascular:      Rate and Rhythm: Normal rate and regular rhythm  Pulses: Normal pulses  Heart sounds: Normal heart sounds  No murmur heard  No friction rub     Pulmonary:      Effort: Pulmonary effort is normal  No accessory muscle usage or respiratory distress  Breath sounds: Normal breath sounds  No stridor  No wheezing, rhonchi or rales  Chest:      Chest wall: No tenderness  Abdominal:      General: There is no distension  Palpations: Abdomen is soft  Tenderness: There is no abdominal tenderness  There is no guarding or rebound  Musculoskeletal:      Cervical back: Normal range of motion  Right lower leg: No tenderness  No edema  Left lower leg: No tenderness  No edema  Skin:     General: Skin is warm and dry  Coloration: Skin is not pale  Findings: No rash  Neurological:      Mental Status: She is alert  GCS: GCS eye subscore is 4  GCS verbal subscore is 5  GCS motor subscore is 6        Gait: Gait (Pt walked from waiting room to rm 30 without difficulty) normal    Psychiatric:         Behavior: Behavior normal          Vital Signs  ED Triage Vitals [04/13/22 1227]   Temperature Pulse Respirations Blood Pressure SpO2   98 °F (36 7 °C) 72 16 114/62 100 %      Temp Source Heart Rate Source Patient Position - Orthostatic VS BP Location FiO2 (%)   Oral Monitor Sitting Right arm --      Pain Score       9           Vitals:    04/13/22 1227   BP: 114/62   Pulse: 72   Patient Position - Orthostatic VS: Sitting         Visual Acuity      ED Medications  Medications   ketorolac (TORADOL) injection 15 mg (15 mg Intravenous Given 4/13/22 1321)       Diagnostic Studies  Results Reviewed     Procedure Component Value Units Date/Time    Basic metabolic panel [[id number]] Collected: 04/13/22 1321    Lab Status: Final result Specimen: Blood from Arm, Right Updated: 04/13/22 1359     Sodium 139 mmol/L      Potassium 4 3 mmol/L      Chloride 104 mmol/L      CO2 28 mmol/L      ANION GAP 7 mmol/L      BUN 14 mg/dL      Creatinine 0 97 mg/dL      Glucose 97 mg/dL      Calcium 8 7 mg/dL      eGFR 71 ml/min/1 73sq m     Narrative:      [place] guidelines for Chronic Kidney Disease (CKD):    Stage 1 with normal or high GFR (GFR > 90 mL/min/1 73 square meters)    Stage 2 Mild CKD (GFR = 60-89 mL/min/1 73 square meters)    Stage 3A Moderate CKD (GFR = 45-59 mL/min/1 73 square meters)    Stage 3B Moderate CKD (GFR = 30-44 mL/min/1 73 square meters)    Stage 4 Severe CKD (GFR = 15-29 mL/min/1 73 square meters)    Stage 5 End Stage CKD (GFR <15 mL/min/1 73 square meters)  Note: GFR calculation is accurate only with a steady state creatinine    HS Troponin 0hr (reflex protocol) [[id number]]  (Normal) Collected: 04/13/22 1321    Lab Status: Final result Specimen: Blood from Arm, Right Updated: 04/13/22 1354     hs TnI 0hr <2 ng/L     CBC and differential [[id number]]  (Abnormal) Collected: 04/13/22 1321    Lab Status: Final result Specimen: Blood from Arm, Right Updated: 04/13/22 1327     WBC 5 31 Thousand/uL      RBC 4 26 Million/uL      Hemoglobin 12 2 g/dL      Hematocrit 37 7 %      MCV 89 fL      MCH 28 6 pg      MCHC 32 4 g/dL      RDW 18 0 %      MPV 9 7 fL      Platelets 258 Thousands/uL      nRBC 0 /100 WBCs      Neutrophils Relative 44 %      Immat GRANS % 0 %      Lymphocytes Relative 44 %      Monocytes Relative 9 %      Eosinophils Relative 2 %      Basophils Relative 1 %      Neutrophils Absolute 2 35 Thousands/µL      Immature Grans Absolute 0 01 Thousand/uL      Lymphocytes Absolute 2 36 Thousands/µL      Monocytes Absolute 0 45 Thousand/µL      Eosinophils Absolute 0 11 Thousand/µL      Basophils Absolute 0 03 Thousands/µL                  XR chest 2 views   ED Interpretation by Nishant Mcallister DO (04/13 3272)   No acute abnormalities                 Procedures  ECG 12 Lead Documentation Only    Date/Time: 4/13/2022 12:40 PM  Performed by: Nishant Mcallister DO  Authorized by: Nishant Mcallister DO     Indications / Diagnosis:  Chest pain  ECG reviewed by me, the ED Provider: yes    Patient location:  Bedside  Previous ECG:     Previous ECG:  Compared to current    Comparison ECG info: 10/18/21    Similarity:  No change  Rate:     ECG rate:  70    ECG rate assessment: normal    Rhythm:     Rhythm: sinus rhythm    Ectopy:     Ectopy: none    QRS:     QRS axis:  Normal    QRS intervals:  Normal  ST segments:     ST segments:  Normal  T waves:     T waves: normal               ED Course  ED Course as of 04/13/22 1428   Wed Apr 13, 2022   1404 Updated pt on labs and instructed her to f/u with PCP for further evaluation  HEART Risk Score      Most Recent Value   Heart Score Risk Calculator    History 0 Filed at: 04/13/2022 1355   ECG 0 Filed at: 04/13/2022 1355   Age 0 Filed at: 04/13/2022 1355   Risk Factors 0 Filed at: 04/13/2022 1355   Troponin 0 Filed at: 04/13/2022 1355   HEART Score 0 Filed at: 04/13/2022 1355                        SBIRT 22yo+      Most Recent Value   SBIRT (25 yo +)    In order to provide better care to our patients, we are screening all of our patients for alcohol and drug use  Would it be okay to ask you these screening questions? No Filed at: 04/13/2022 1230                    MDM    Disposition  Final diagnoses:   Chest tightness     Time reflects when diagnosis was documented in both MDM as applicable and the Disposition within this note     Time User Action Codes Description Comment    4/13/2022  1:28 PM Gracie Gomez 47 [R07 89] Chest tightness       ED Disposition     ED Disposition Condition Date/Time Comment    Discharge Stable Wed Apr 13, 2022  2:04 PM Laurent Cordero discharge to home/self care              Follow-up Information     Follow up With Specialties Details Why Contact Info    Jonathan Palacios MD [facility] Schedule an appointment as soon as possible for a visit  For follow up/further evaluation [address]  [phone]            Discharge Medication List as of 4/13/2022  2:04 PM      CONTINUE these medications which have NOT CHANGED    Details   Ascorbic Acid (vitamin C) 100 MG tablet Take 1 tablet by mouth daily, Historical Med      norethindrone-ethinyl estradiol (Junel FE 1/20) 1-20 MG-MCG per tablet Take 1 tablet by mouth daily, Starting Tue 10/19/2021, Until Wed 10/19/2022, Normal      VITAMIN D PO Take 1 tablet by mouth daily, Historical Med             No discharge procedures on file      PDMP Review     None          ED Provider  Electronically Signed by           Nishant Barker 24, DO  04/13/22 7474

## 2022-10-04 ENCOUNTER — OFFICE VISIT (OUTPATIENT)
Dept: URGENT CARE | Age: 42
End: 2022-10-04
Payer: COMMERCIAL

## 2022-10-04 VITALS
HEART RATE: 77 BPM | TEMPERATURE: 97.3 F | HEIGHT: 65 IN | DIASTOLIC BLOOD PRESSURE: 76 MMHG | BODY MASS INDEX: 29.16 KG/M2 | RESPIRATION RATE: 17 BRPM | WEIGHT: 175 LBS | SYSTOLIC BLOOD PRESSURE: 110 MMHG | OXYGEN SATURATION: 100 %

## 2022-10-04 DIAGNOSIS — H57.12 LEFT EYE PAIN: Primary | ICD-10-CM

## 2022-10-04 PROCEDURE — 99213 OFFICE O/P EST LOW 20 MIN: CPT

## 2022-10-04 RX ORDER — TETRACAINE HYDROCHLORIDE 5 MG/ML
1 SOLUTION OPHTHALMIC ONCE
Status: COMPLETED | OUTPATIENT
Start: 2022-10-04 | End: 2022-10-04

## 2022-10-04 RX ORDER — ERYTHROMYCIN 5 MG/G
0.5 OINTMENT OPHTHALMIC EVERY 6 HOURS SCHEDULED
Qty: 28 G | Refills: 0 | Status: SHIPPED | OUTPATIENT
Start: 2022-10-04 | End: 2022-10-04 | Stop reason: CLARIF

## 2022-10-04 RX ORDER — ERYTHROMYCIN 5 MG/G
0.5 OINTMENT OPHTHALMIC EVERY 6 HOURS SCHEDULED
Qty: 28 G | Refills: 0 | Status: SHIPPED | OUTPATIENT
Start: 2022-10-04 | End: 2022-10-11

## 2022-10-04 RX ORDER — MAGNESIUM OXIDE/MAG AA CHELATE 133 MG
1 TABLET ORAL 2 TIMES DAILY
COMMUNITY

## 2022-10-04 RX ORDER — POTASSIUM CHLORIDE 750 MG/1
10 TABLET, EXTENDED RELEASE ORAL 2 TIMES DAILY
COMMUNITY

## 2022-10-04 RX ADMIN — TETRACAINE HYDROCHLORIDE 1 DROP: 5 SOLUTION OPHTHALMIC at 10:21

## 2022-10-04 NOTE — PROGRESS NOTES
[facility]        NAME: Luis Naqvi is a 36 y o  female  : 1980    MRN: [id number]  DATE: 2022  TIME: 10:19 AM    Assessment and Plan   Left eye pain [H57 12]  1  Left eye pain  tetracaine 0 5 % ophthalmic solution 1 drop    fluorescein sodium sterile 1 mg ophthalmic strip 1 strip    erythromycin (ILOTYCIN) ophthalmic ointment         Patient Instructions     Fluorescein uptake negative  No evidence of foreign body  Start erythro ointment & f/u with Opthal   Patient verbalized understanding  Follow up with PCP in 2-3 days  Proceed to ER if symptoms worsen  Chief Complaint     Chief Complaint   Patient presents with    Eye Pain     Left eye blurred vision, dryness and pain since Sat, nasal congestion through weekend but resolved  Photosensitive, +redness  No drainage         History of Present Illness     36 y o  F presents with complaint of L eye dryness, pain, and blurred vision x 4 days  Denies floaters or flashing lights  Denies light sensitivity  Denies trauma or injury  States she was congested over the weekend but this has since resolved  Denies itchiness  States the eye is a little red and it's blurry at times from the dryness  Not using anything OTC  Denies drainage or crusting  Denies sick contacts  No contact lenses  Review of Systems   Review of Systems   Constitutional: Negative for chills, fatigue and fever  HENT: Negative for congestion, ear pain, facial swelling, hearing loss, rhinorrhea, sinus pressure, sneezing, sore throat and trouble swallowing  Eyes: Positive for pain and redness  Negative for photophobia, discharge, itching and visual disturbance  Respiratory: Negative for cough, chest tightness, shortness of breath and wheezing  Cardiovascular: Negative for chest pain and palpitations  Gastrointestinal: Negative for abdominal pain, diarrhea, nausea and vomiting  Genitourinary: Negative for dysuria, flank pain, hematuria and pelvic pain  Musculoskeletal: Negative for arthralgias, back pain and myalgias  Skin: Negative for color change and rash  Neurological: Negative for dizziness, seizures, syncope, weakness, light-headedness and headaches  Psychiatric/Behavioral: Negative for confusion, hallucinations and sleep disturbance  The patient is not nervous/anxious  All other systems reviewed and are negative  Current Medications       Current Outpatient Medications:     erythromycin (ILOTYCIN) ophthalmic ointment, Administer 0 5 inches into the left eye every 6 (six) hours for 7 days, Disp: 28 g, Rfl: 0    potassium chloride (K-DUR,KLOR-CON) 10 mEq tablet, Take 10 mEq by mouth 2 (two) times a day, Disp: , Rfl:     Specialty Vitamins Products (magnesium, amino acid chelate,) 133 MG tablet, Take 1 tablet by mouth 2 (two) times a day, Disp: , Rfl:     VITAMIN D PO, Take 1 tablet by mouth daily, Disp: , Rfl:     Ascorbic Acid (vitamin C) 100 MG tablet, Take 1 tablet by mouth daily, Disp: , Rfl:     norethindrone-ethinyl estradiol (Junel FE 1/20) 1-20 MG-MCG per tablet, Take 1 tablet by mouth daily, Disp: 28 tablet, Rfl: 12    Current Facility-Administered Medications:     fluorescein sodium sterile 1 mg ophthalmic strip 1 strip, 1 strip, Left Eye, Once, [employer], CRNP    tetracaine 0 5 % ophthalmic solution 1 drop, 1 drop, Left Eye, Once, SLIM Valentino    Current Allergies     Allergies as of 10/04/2022    (No Known Allergies)            The following portions of the patient's history were reviewed and updated as appropriate: allergies, current medications, past family history, past medical history, past social history, past surgical history and problem list      History reviewed  No pertinent past medical history      Past Surgical History:   Procedure Laterality Date    ABDOMINOPLASTY      AUGMENTATION BREAST      implants    CARPAL TUNNEL RELEASE      COMBINED HYSTEROSCOPY DIAGNOSTIC / D&C      DILATION AND EVACUATION N/A 10/18/2021    Procedure: DILATATION AND EVACUATION (D&E) (5 WEEKS, retained POC); Surgeon: Kelli Landrum MD;  Location: [facility];  Service: Obstetrics    GASTRIC BYPASS         History reviewed  No pertinent family history  Medications have been verified  Objective   /76   Pulse 77   Temp (!) 97 3 °F (36 3 °C)   Resp 17   Ht 5' 5" (1 651 m)   Wt 79 4 kg (175 lb)   LMP 09/28/2022   SpO2 100%   BMI 29 12 kg/m²   Patient's last menstrual period was 09/28/2022  Physical Exam     Physical Exam  Vitals reviewed  Constitutional:       General: She is not in acute distress  Appearance: Normal appearance  She is not toxic-appearing  HENT:      Head: Normocephalic  Right Ear: Tympanic membrane normal  Tympanic membrane is not erythematous or bulging  Left Ear: Tympanic membrane normal  Tympanic membrane is not erythematous or bulging  Nose: No congestion or rhinorrhea  Right Sinus: No maxillary sinus tenderness or frontal sinus tenderness  Left Sinus: No maxillary sinus tenderness or frontal sinus tenderness  Mouth/Throat:      Pharynx: Uvula midline  No oropharyngeal exudate, posterior oropharyngeal erythema or uvula swelling  Tonsils: No tonsillar exudate or tonsillar abscesses  Eyes:      General: No allergic shiner, visual field deficit or scleral icterus  Left eye: No foreign body, discharge or hordeolum  Extraocular Movements: Extraocular movements intact  Conjunctiva/sclera:      Left eye: Left conjunctiva is injected  Pupils: Pupils are equal, round, and reactive to light  Left eye: No corneal abrasion or fluorescein uptake  Jatin exam negative  Comments:   No stye or chalazion  No discharge or crusting  No evidence of foreign bodies    Negative fluorescein uptake  Negative Jatin sign   Cardiovascular:      Rate and Rhythm: Normal rate and regular rhythm  Pulses: Normal pulses  Heart sounds: Normal heart sounds  Pulmonary:      Effort: Pulmonary effort is normal  No tachypnea or respiratory distress  Breath sounds: Normal breath sounds and air entry  No decreased breath sounds, wheezing, rhonchi or rales  Abdominal:      General: Bowel sounds are normal       Palpations: Abdomen is soft  Tenderness: There is no abdominal tenderness  There is no right CVA tenderness or guarding  Musculoskeletal:         General: Normal range of motion  Cervical back: Normal range of motion  Lymphadenopathy:      Cervical: No cervical adenopathy  Skin:     General: Skin is warm and dry  Neurological:      General: No focal deficit present  Mental Status: She is alert  Cranial Nerves: Cranial nerves are intact  Sensory: Sensation is intact  Motor: Motor function is intact  Coordination: Coordination is intact  Gait: Gait is intact  Deep Tendon Reflexes: Reflexes are normal and symmetric

## 2023-11-15 ENCOUNTER — OFFICE VISIT (OUTPATIENT)
Dept: URGENT CARE | Age: 43
End: 2023-11-15
Payer: COMMERCIAL

## 2023-11-15 VITALS
RESPIRATION RATE: 18 BRPM | TEMPERATURE: 98.2 F | SYSTOLIC BLOOD PRESSURE: 114 MMHG | OXYGEN SATURATION: 99 % | DIASTOLIC BLOOD PRESSURE: 72 MMHG | HEART RATE: 87 BPM

## 2023-11-15 DIAGNOSIS — M54.40 ACUTE RIGHT-SIDED LOW BACK PAIN WITH SCIATICA, SCIATICA LATERALITY UNSPECIFIED: Primary | ICD-10-CM

## 2023-11-15 DIAGNOSIS — R21 RASH: ICD-10-CM

## 2023-11-15 PROCEDURE — 99212 OFFICE O/P EST SF 10 MIN: CPT | Performed by: EMERGENCY MEDICINE

## 2023-11-15 RX ORDER — PREDNISONE 20 MG/1
60 TABLET ORAL DAILY
Qty: 15 TABLET | Refills: 0 | Status: SHIPPED | OUTPATIENT
Start: 2023-11-15 | End: 2023-11-20

## 2023-11-16 NOTE — PROGRESS NOTES
[facility]        NAME: Ileana Alves is a 39 y.o. female  : 1980    MRN: [id number]  DATE: November 15, 2023  TIME: 7:19 PM    Assessment and Plan   Acute right-sided low back pain with sciatica, sciatica laterality unspecified [M54.40]  1. Acute right-sided low back pain with sciatica, sciatica laterality unspecified  Diclofenac Sodium (Voltaren) 1 %    predniSONE 20 mg tablet    Ambulatory Referral to Physical Therapy      2. Rash          -No red flag signs or symptoms notified on history or physical, patient otherwise neurologically intact  -We will give short course steroids for back pain as well as pruritic rash, also prescribe topical Voltaren given history of gastric bypass  -Patient given referral to physical therapy and advised to follow-up closely and seek care in ED if symptoms worsen  -All questions answered at bedside, patient amenable to plan and voiced understanding    Patient Instructions       Follow up with PCP in 3-5 days. Proceed to  ER if symptoms worsen. Chief Complaint     Chief Complaint   Patient presents with    Back Pain     Last Wednesday started lower back pain into right hip. Has been taking ibuprofen, some muscle relaxers but not helping. Goes away and comes back    Rash     Rash on stomach for past 5 days. Unsure if it was from new soap. Skin is now dry and itchy         History of Present Illness       48-year-old female with history of iron deficiency anemia presents with chief complaint of waxing and waning right-sided low back pain with radicular symptoms down her right leg which been ongoing for the last week. Patient denies associated saddle anesthesia, leg weakness numbness or bowel bladder symptoms. Patient has been taking ibuprofen, Tylenol and Flexeril with some relief of her symptoms. She states these medications have made her pain and symptoms "completely disappear" however they returned several hours later.   She denies antecedent heavy lifting or trauma to the affected area. She states that she also developed a midline localized pruritic rash to her anterior abdomen approximately 5 days ago for which she has been applying hydrocortisone cream with some relief. Back Pain  This is a new problem. The current episode started in the past 7 days. The problem occurs intermittently. The problem has been waxing and waning since onset. The pain is present in the lumbar spine. The pain radiates to the right knee. The pain is at a severity of 4/10. The pain is moderate. The symptoms are aggravated by bending, sitting and twisting. Pertinent negatives include no abdominal pain, bladder incontinence, bowel incontinence, chest pain, dysuria, fever, headaches, leg pain, numbness, paresis, paresthesias, pelvic pain, perianal numbness, tingling, weakness or weight loss. She has tried NSAIDs and muscle relaxant for the symptoms. The treatment provided significant relief. Rash  Pertinent negatives include no congestion, cough, fatigue, fever, rhinorrhea, shortness of breath, sore throat or vomiting. Review of Systems   Review of Systems   Constitutional:  Negative for activity change, appetite change, chills, diaphoresis, fatigue, fever, unexpected weight change and weight loss. HENT:  Negative for congestion, dental problem, drooling, ear discharge, ear pain, facial swelling, hearing loss, mouth sores, nosebleeds, rhinorrhea and sore throat. Eyes:  Negative for pain and visual disturbance. Respiratory:  Negative for cough and shortness of breath. Cardiovascular:  Negative for chest pain and palpitations. Gastrointestinal:  Negative for abdominal pain, bowel incontinence and vomiting. Genitourinary:  Negative for bladder incontinence, dysuria, hematuria and pelvic pain. Musculoskeletal:  Positive for arthralgias and back pain. Negative for gait problem, joint swelling, myalgias, neck pain and neck stiffness. Skin:  Positive for rash.  Negative for color change. Neurological:  Negative for dizziness, tingling, tremors, seizures, syncope, facial asymmetry, speech difficulty, weakness, light-headedness, numbness, headaches and paresthesias. All other systems reviewed and are negative. Current Medications       Current Outpatient Medications:     Diclofenac Sodium (Voltaren) 1 %, Apply 2 g topically 4 (four) times a day as needed (pain) for up to 25 days, Disp: 100 g, Rfl: 1    potassium chloride (K-DUR,KLOR-CON) 10 mEq tablet, Take 10 mEq by mouth 2 (two) times a day, Disp: , Rfl:     predniSONE 20 mg tablet, Take 3 tablets (60 mg total) by mouth daily for 5 days, Disp: 15 tablet, Rfl: 0    Specialty Vitamins Products (magnesium, amino acid chelate,) 133 MG tablet, Take 1 tablet by mouth 2 (two) times a day, Disp: , Rfl:     VITAMIN D PO, Take 1 tablet by mouth daily, Disp: , Rfl:     Ascorbic Acid (vitamin C) 100 MG tablet, Take 1 tablet by mouth daily, Disp: , Rfl:     norethindrone-ethinyl estradiol (Junel FE 1/20) 1-20 MG-MCG per tablet, Take 1 tablet by mouth daily, Disp: 28 tablet, Rfl: 12    Current Allergies     Allergies as of 11/15/2023    (No Known Allergies)            The following portions of the patient's history were reviewed and updated as appropriate: allergies, current medications, past family history, past medical history, past social history, past surgical history and problem list.     History reviewed. No pertinent past medical history. Past Surgical History:   Procedure Laterality Date    ABDOMINOPLASTY      AUGMENTATION BREAST      implants    CARPAL TUNNEL RELEASE      COMBINED HYSTEROSCOPY DIAGNOSTIC / D&C      DILATION AND EVACUATION N/A 10/18/2021    Procedure: DILATATION AND EVACUATION (D&E) (5 WEEKS, retained POC); Surgeon: Salbador Oneill MD;  Location: [facility];  Service: Obstetrics    GASTRIC BYPASS         History reviewed. No pertinent family history. Medications have been verified.         Objective /72   Pulse 87   Temp 98.2 °F (36.8 °C)   Resp 18   SpO2 99%   No LMP recorded. Physical Exam     Physical Exam  Vitals and nursing note reviewed. Constitutional:       General: She is not in acute distress. Appearance: Normal appearance. She is obese. She is not ill-appearing, toxic-appearing or diaphoretic. HENT:      Head: Atraumatic. Right Ear: External ear normal.      Left Ear: External ear normal.      Nose: No congestion or rhinorrhea. Mouth/Throat:      Mouth: Mucous membranes are moist.      Pharynx: No posterior oropharyngeal erythema. Eyes:      General:         Right eye: No discharge. Left eye: No discharge. Extraocular Movements: Extraocular movements intact. Conjunctiva/sclera: Conjunctivae normal.      Pupils: Pupils are equal, round, and reactive to light. Cardiovascular:      Rate and Rhythm: Regular rhythm. Pulses: Normal pulses. Heart sounds: Normal heart sounds. Pulmonary:      Effort: Pulmonary effort is normal. No respiratory distress. Breath sounds: Normal breath sounds. No stridor. No wheezing, rhonchi or rales. Chest:      Chest wall: No tenderness. Abdominal:      General: There is no distension. Palpations: There is no mass. Tenderness: There is no abdominal tenderness. There is no right CVA tenderness, left CVA tenderness, guarding or rebound. Hernia: No hernia is present. Musculoskeletal:         General: Tenderness present. No swelling, deformity or signs of injury. Cervical back: Normal, normal range of motion and neck supple. Thoracic back: Normal.      Lumbar back: Tenderness present. No swelling, edema, deformity, signs of trauma, lacerations, spasms or bony tenderness. Normal range of motion. Negative right straight leg raise test and negative left straight leg raise test. No scoliosis. Back:       Right lower leg: No edema. Left lower leg: No edema.    Skin: Coloration: Skin is not jaundiced or pale. Findings: No bruising, erythema, lesion or rash. Neurological:      General: No focal deficit present. Mental Status: She is alert and oriented to person, place, and time. Cranial Nerves: No cranial nerve deficit. Sensory: No sensory deficit. Motor: No weakness. Coordination: Coordination normal.      Gait: Gait normal.      Deep Tendon Reflexes: Reflexes normal.      Reflex Scores:       Patellar reflexes are 2+ on the right side and 2+ on the left side. Achilles reflexes are 2+ on the right side and 2+ on the left side.      Comments: No saddle anesthesia noted, muscle strength 5/5 in bilateral lower extremity hip flexion, extension, knee flexion, knee extension and dorsiflexion plantarflexion   Psychiatric:         Mood and Affect: Mood normal.         Behavior: Behavior normal.

## 2023-12-15 ENCOUNTER — APPOINTMENT (OUTPATIENT)
Dept: URGENT CARE | Age: 43
End: 2023-12-15
Payer: COMMERCIAL

## 2023-12-15 ENCOUNTER — OFFICE VISIT (OUTPATIENT)
Dept: URGENT CARE | Age: 43
End: 2023-12-15
Payer: COMMERCIAL

## 2023-12-15 VITALS — HEART RATE: 72 BPM | TEMPERATURE: 99.2 F | OXYGEN SATURATION: 99 % | RESPIRATION RATE: 18 BRPM

## 2023-12-15 DIAGNOSIS — M54.41 ACUTE RIGHT-SIDED LOW BACK PAIN WITH RIGHT-SIDED SCIATICA: Primary | ICD-10-CM

## 2023-12-15 PROCEDURE — 99213 OFFICE O/P EST LOW 20 MIN: CPT

## 2023-12-15 NOTE — PROGRESS NOTES
[facility]        NAME: Kellen Duran is a 39 y.o. female  : 1980    MRN: [id number]  DATE: December 15, 2023  TIME: 2:58 PM    Assessment and Plan   Acute right-sided low back pain with right-sided sciatica [M54.41]  1. Acute right-sided low back pain with right-sided sciatica          Pt presents for low back pain with right sciatica. Seen about a month ago. Was given steroids, diclofenac and voltaren gel. Also given a PT referral. Pt initially stated she did not f/u with PT and then said she called but was told the wait list was 5 weeks. PT referral line was called in room with patient present and advised there is no wait for PT. Advised to f/u with referral.     Patient Instructions       Follow up with PCP as needed    Chief Complaint     Chief Complaint   Patient presents with    Leg Pain     Past 3 days with sharp pain from right buttocks down thigh. [place] F[place] is a pressure/burning sensation. History of Present Illness       Pt presents for low back pain with right sciatica. Seen about a month ago. Was given steroids, diclofenac and voltaren gel. Also given a PT referral. Pt initially stated she did not f/u with PT and then said she called but was told the wait list was 5 weeks. PT referral line was called in room with patient present and advised there is no wait for PT. Advised to f/u with referral.     Leg Pain         Review of Systems   Review of Systems   Musculoskeletal:  Positive for back pain. All other systems reviewed and are negative.         Current Medications       Current Outpatient Medications:     Specialty Vitamins Products (magnesium, amino acid chelate,) 133 MG tablet, Take 1 tablet by mouth 2 (two) times a day, Disp: , Rfl:     VITAMIN D PO, Take 1 tablet by mouth daily, Disp: , Rfl:     Ascorbic Acid (vitamin C) 100 MG tablet, Take 1 tablet by mouth daily, Disp: , Rfl:     Diclofenac Sodium (Voltaren) 1 %, Apply 2 g topically 4 (four) times a day as needed (pain) for up to 25 days, Disp: 100 g, Rfl: 1    norethindrone-ethinyl estradiol (Junel FE 1/20) 1-20 MG-MCG per tablet, Take 1 tablet by mouth daily, Disp: 28 tablet, Rfl: 12    potassium chloride (K-DUR,KLOR-CON) 10 mEq tablet, Take 10 mEq by mouth 2 (two) times a day, Disp: , Rfl:     Current Allergies     Allergies as of 12/15/2023    (No Known Allergies)            The following portions of the patient's history were reviewed and updated as appropriate: allergies, current medications, past family history, past medical history, past social history, past surgical history and problem list.     No past medical history on file. Past Surgical History:   Procedure Laterality Date    ABDOMINOPLASTY      AUGMENTATION BREAST      implants    CARPAL TUNNEL RELEASE      COMBINED HYSTEROSCOPY DIAGNOSTIC / D&C      DILATION AND EVACUATION N/A 10/18/2021    Procedure: DILATATION AND EVACUATION (D&E) (5 WEEKS, retained POC); Surgeon: Darion Elena MD;  Location: [facility];  Service: Obstetrics    GASTRIC BYPASS         No family history on file. Medications have been verified. Objective   Pulse 72   Temp 99.2 °F (37.3 °C)   Resp 18   SpO2 99%   No LMP recorded. Physical Exam     Physical Exam  Vitals reviewed. Constitutional:       Appearance: Normal appearance. Musculoskeletal:         General: No swelling or signs of injury. Skin:     Findings: No bruising or erythema. Neurological:      Mental Status: She is alert.

## 2024-01-09 ENCOUNTER — TELEPHONE (OUTPATIENT)
Dept: FAMILY MEDICINE CLINIC | Facility: CLINIC | Age: 44
End: 2024-01-09

## 2024-01-19 NOTE — TELEPHONE ENCOUNTER
01/18/24 11:41 PM        The office's request has been received, reviewed, and the patient chart updated. The PCP has successfully been removed with a patient attribution note. This message will now be completed.        Thank you  Mariza Vasquez

## (undated) DEVICE — PVC URETHRAL CATHETER: Brand: DOVER

## (undated) DEVICE — GLOVE INDICATOR PI UNDERGLOVE SZ 7 BLUE

## (undated) DEVICE — GLOVE PI ULTRA TOUCH SZ.7.0

## (undated) DEVICE — BETHLEHEM UNIVERSAL MINOR VAG: Brand: CARDINAL HEALTH

## (undated) DEVICE — DRAPE SHEET THREE QUARTER